# Patient Record
Sex: MALE | Race: WHITE | URBAN - METROPOLITAN AREA
[De-identification: names, ages, dates, MRNs, and addresses within clinical notes are randomized per-mention and may not be internally consistent; named-entity substitution may affect disease eponyms.]

---

## 2023-08-04 ENCOUNTER — TELEPHONE (OUTPATIENT)
Dept: ORTHOPEDIC SURGERY | Age: 33
End: 2023-08-04

## 2023-08-08 ENCOUNTER — TELEPHONE (OUTPATIENT)
Dept: ORTHOPEDIC SURGERY | Age: 33
End: 2023-08-08

## 2023-08-08 NOTE — TELEPHONE ENCOUNTER
I am covering  for  susans messages  while she is out.     I think she  was  previously asking  in the  earlier  encounter  if  MARY JANE  and or  Ayanna Weir  will see a pt  and  do  a  DOT card    He is new to our  practice

## 2023-08-16 ENCOUNTER — TELEPHONE (OUTPATIENT)
Dept: ORTHOPEDIC SURGERY | Age: 33
End: 2023-08-16

## 2023-08-16 NOTE — TELEPHONE ENCOUNTER
I asked  dr Oma Keen and  dr Ayah Pagan  if they  would  be  appropriate  for  filling  out a  DOT  form  Pt  has had the physical  and  needs a form filled  out  by  ortho,.     He  is  aware  that no  one  here does this  and  dr Ayah Pagan  suggested  he  try  [address]  with pt  on  8/16/23  dt